# Patient Record
Sex: MALE | Race: WHITE | NOT HISPANIC OR LATINO | Employment: FULL TIME | URBAN - METROPOLITAN AREA
[De-identification: names, ages, dates, MRNs, and addresses within clinical notes are randomized per-mention and may not be internally consistent; named-entity substitution may affect disease eponyms.]

---

## 2021-11-19 ENCOUNTER — OFFICE VISIT (OUTPATIENT)
Dept: URGENT CARE | Facility: CLINIC | Age: 21
End: 2021-11-19
Payer: OTHER GOVERNMENT

## 2021-11-19 VITALS
SYSTOLIC BLOOD PRESSURE: 112 MMHG | WEIGHT: 190 LBS | TEMPERATURE: 98 F | BODY MASS INDEX: 28.14 KG/M2 | HEART RATE: 72 BPM | HEIGHT: 69 IN | OXYGEN SATURATION: 97 % | DIASTOLIC BLOOD PRESSURE: 75 MMHG
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
## 2021-11-19 DIAGNOSIS — S60.812A ABRASION OF LEFT WRIST, INITIAL ENCOUNTER: ICD-10-CM

## 2021-11-19 DIAGNOSIS — S61.411A LACERATION OF SKIN OF RIGHT PALM, INITIAL ENCOUNTER: ICD-10-CM

## 2021-11-19 PROCEDURE — 12041 INTMD RPR N-HF/GENIT 2.5CM/<: CPT | Mod: S$GLB,,, | Performed by: PHYSICIAN ASSISTANT

## 2021-11-19 PROCEDURE — 12041 LACERATION REPAIR: ICD-10-PCS | Mod: S$GLB,,, | Performed by: PHYSICIAN ASSISTANT

## 2021-11-19 PROCEDURE — 99203 PR OFFICE/OUTPT VISIT, NEW, LEVL III, 30-44 MIN: ICD-10-PCS | Mod: 25,S$GLB,, | Performed by: PHYSICIAN ASSISTANT

## 2021-11-19 PROCEDURE — 99203 OFFICE O/P NEW LOW 30 MIN: CPT | Mod: 25,S$GLB,, | Performed by: PHYSICIAN ASSISTANT

## 2021-11-22 ENCOUNTER — OFFICE VISIT (OUTPATIENT)
Dept: URGENT CARE | Facility: CLINIC | Age: 21
End: 2021-11-22
Payer: OTHER GOVERNMENT

## 2021-11-22 VITALS
OXYGEN SATURATION: 98 % | HEIGHT: 69 IN | SYSTOLIC BLOOD PRESSURE: 123 MMHG | HEART RATE: 65 BPM | WEIGHT: 190 LBS | BODY MASS INDEX: 28.14 KG/M2 | TEMPERATURE: 98 F | DIASTOLIC BLOOD PRESSURE: 66 MMHG | RESPIRATION RATE: 16 BRPM

## 2021-11-22 DIAGNOSIS — S61.411A LACERATION OF SKIN OF RIGHT PALM, INITIAL ENCOUNTER: ICD-10-CM

## 2021-11-22 DIAGNOSIS — S60.812A ABRASION OF LEFT WRIST, INITIAL ENCOUNTER: Primary | ICD-10-CM

## 2021-11-22 PROCEDURE — 99212 OFFICE O/P EST SF 10 MIN: CPT | Mod: 25,S$GLB,, | Performed by: EMERGENCY MEDICINE

## 2021-11-22 PROCEDURE — 99212 PR OFFICE/OUTPT VISIT, EST, LEVL II, 10-19 MIN: ICD-10-PCS | Mod: 25,S$GLB,, | Performed by: EMERGENCY MEDICINE

## 2023-03-13 ENCOUNTER — HOSPITAL ENCOUNTER (EMERGENCY)
Facility: OTHER | Age: 23
Discharge: HOME | End: 2023-03-13
Attending: EMERGENCY MEDICINE
Payer: COMMERCIAL

## 2023-03-13 VITALS
TEMPERATURE: 99 F | BODY MASS INDEX: 29.62 KG/M2 | WEIGHT: 200 LBS | HEART RATE: 82 BPM | HEIGHT: 69 IN | SYSTOLIC BLOOD PRESSURE: 132 MMHG | OXYGEN SATURATION: 99 % | DIASTOLIC BLOOD PRESSURE: 80 MMHG | RESPIRATION RATE: 18 BRPM

## 2023-03-13 DIAGNOSIS — S09.90XA INJURY OF HEAD, INITIAL ENCOUNTER: Primary | ICD-10-CM

## 2023-03-13 DIAGNOSIS — S06.0X0A CONCUSSION WITHOUT LOSS OF CONSCIOUSNESS, INITIAL ENCOUNTER: ICD-10-CM

## 2023-03-13 PROCEDURE — 25000003 PHARM REV CODE 250: Performed by: NURSE PRACTITIONER

## 2023-03-13 PROCEDURE — 99283 EMERGENCY DEPT VISIT LOW MDM: CPT

## 2023-03-13 RX ORDER — ACETAMINOPHEN 500 MG
1000 TABLET ORAL
Status: COMPLETED | OUTPATIENT
Start: 2023-03-13 | End: 2023-03-13

## 2023-03-13 RX ADMIN — ACETAMINOPHEN 1000 MG: 500 TABLET, FILM COATED ORAL at 06:03
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
## 2023-03-14 ENCOUNTER — PATIENT MESSAGE (OUTPATIENT)
Dept: ADMINISTRATIVE | Facility: OTHER | Age: 23
End: 2023-03-14
Payer: COMMERCIAL
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
## 2023-03-28 ENCOUNTER — TELEPHONE (OUTPATIENT)
Dept: ADMINISTRATIVE | Facility: OTHER | Age: 23
End: 2023-03-28
Payer: COMMERCIAL